# Patient Record
Sex: FEMALE | Race: WHITE | NOT HISPANIC OR LATINO | Employment: OTHER | RURAL
[De-identification: names, ages, dates, MRNs, and addresses within clinical notes are randomized per-mention and may not be internally consistent; named-entity substitution may affect disease eponyms.]

---

## 2020-03-11 ENCOUNTER — HISTORICAL (OUTPATIENT)
Dept: ADMINISTRATIVE | Facility: HOSPITAL | Age: 70
End: 2020-03-11

## 2020-03-11 LAB
ALBUMIN SERPL BCP-MCNC: 3.7 G/DL (ref 3.5–5)
ALP SERPL-CCNC: 62 U/L (ref 55–142)
ALT SERPL W P-5'-P-CCNC: 28 U/L (ref 13–56)
AST SERPL W P-5'-P-CCNC: 12 U/L (ref 15–37)
BASOPHILS # BLD AUTO: 0.04 X10E3/UL (ref 0–0.2)
BASOPHILS NFR BLD AUTO: 0.5 % (ref 0–1)
BILIRUB DIRECT SERPL-MCNC: 0.1 MG/DL (ref 0–0.2)
BILIRUB SERPL-MCNC: 0.5 MG/DL (ref 0–1.2)
BILIRUB UR QL STRIP: NEGATIVE MG/DL
BUN SERPL-MCNC: 17 MG/DL (ref 7–18)
CALCIUM SERPL-MCNC: 9.4 MG/DL (ref 8.5–10.1)
CHLORIDE SERPL-SCNC: 104 MMOL/L (ref 98–107)
CHOLEST SERPL-MCNC: 149 MG/DL
CHOLEST/HDLC SERPL: 3 {RATIO}
CLARITY UR: CLEAR
CO2 SERPL-SCNC: 27 MMOL/L (ref 21–32)
COLOR UR: YELLOW
CREAT SERPL-MCNC: 1.04 MG/DL (ref 0.5–1.02)
CREAT UR-MCNC: 120 MG/DL (ref 28–217)
EOSINOPHIL # BLD AUTO: 0.14 X10E3/UL (ref 0–0.5)
EOSINOPHIL NFR BLD AUTO: 1.8 % (ref 1–4)
ERYTHROCYTE [DISTWIDTH] IN BLOOD BY AUTOMATED COUNT: 13 % (ref 11.5–14.5)
EST. AVERAGE GLUCOSE BLD GHB EST-MCNC: 164 MG/DL
GLUCOSE SERPL-MCNC: 144 MG/DL (ref 74–106)
GLUCOSE UR STRIP-MCNC: NEGATIVE MG/DL
HBA1C MFR BLD HPLC: 7.5 %
HCT VFR BLD AUTO: 40.3 % (ref 38–47)
HDLC SERPL-MCNC: 50 MG/DL
HGB BLD-MCNC: 12.9 G/DL (ref 12–16)
IMM GRANULOCYTES # BLD AUTO: 0.03 X10E3/UL (ref 0–0.04)
IMM GRANULOCYTES NFR BLD: 0.4 % (ref 0–0.4)
KETONES UR STRIP-SCNC: NEGATIVE MG/DL
LDLC SERPL CALC-MCNC: 74 MG/DL
LEUKOCYTE ESTERASE UR QL STRIP: NEGATIVE LEU/UL
LYMPHOCYTES # BLD AUTO: 2.72 X10E3/UL (ref 1–4.8)
LYMPHOCYTES NFR BLD AUTO: 34 % (ref 27–41)
MCH RBC QN AUTO: 29.5 PG (ref 27–31)
MCHC RBC AUTO-ENTMCNC: 32 G/DL (ref 32–36)
MCV RBC AUTO: 92 FL (ref 80–96)
MICROALBUMIN UR-MCNC: 0.6 MG/DL (ref 0–2.8)
MICROALBUMIN/CREAT RATIO PNL UR: <30 MG/G (ref 0–30)
MONOCYTES # BLD AUTO: 0.5 X10E3/UL (ref 0–0.8)
MONOCYTES NFR BLD AUTO: 6.3 % (ref 2–6)
MPC BLD CALC-MCNC: 9.6 FL (ref 9.4–12.4)
NEUTROPHILS # BLD AUTO: 4.57 X10E3/UL (ref 1.8–7.7)
NEUTROPHILS NFR BLD AUTO: 57 % (ref 53–65)
NITRITE UR QL STRIP: NEGATIVE
NRBC # BLD AUTO: 0 X10E3/UL (ref 0–0)
NRBC, AUTO (.00): 0 /100 (ref 0–0)
PH UR STRIP: 5.5 PH UNITS (ref 5–8)
PLATELET # BLD AUTO: 287 X10E3/UL (ref 150–400)
POTASSIUM SERPL-SCNC: 3.9 MMOL/L (ref 3.5–5.1)
PROT SERPL-MCNC: 7.5 G/DL (ref 6.4–8.2)
PROT UR QL STRIP: NEGATIVE MG/DL
RBC # BLD AUTO: 4.38 X10E6/UL (ref 4.2–5.4)
RBC # UR STRIP: NEGATIVE ERY/UL
SODIUM SERPL-SCNC: 137 MMOL/L (ref 136–145)
SP GR UR STRIP: 1.02 (ref 1–1.03)
TRIGL SERPL-MCNC: 125 MG/DL
TSH SERPL DL<=0.005 MIU/L-ACNC: 1.21 UIU/ML (ref 0.36–3.74)
UROBILINOGEN UR STRIP-ACNC: 0.2 MG/DL
WBC # BLD AUTO: 8 X10E3/UL (ref 4.5–11)

## 2020-11-10 ENCOUNTER — HISTORICAL (OUTPATIENT)
Dept: ADMINISTRATIVE | Facility: HOSPITAL | Age: 70
End: 2020-11-10

## 2020-11-10 LAB
ANION GAP SERPL CALCULATED.3IONS-SCNC: 8.1 MMOL/L (ref 7–16)
BUN SERPL-MCNC: 13 MG/DL (ref 7–18)
CALCIUM SERPL-MCNC: 9 MG/DL (ref 8.5–10.1)
CHLORIDE SERPL-SCNC: 105 MMOL/L (ref 98–107)
CO2 SERPL-SCNC: 29 MMOL/L (ref 21–32)
CREAT SERPL-MCNC: 0.92 MG/DL (ref 0.5–1.02)
GLUCOSE SERPL-MCNC: 146 MG/DL (ref 74–106)
POTASSIUM SERPL-SCNC: 4.1 MMOL/L (ref 3.5–5.1)
SODIUM SERPL-SCNC: 138 MMOL/L (ref 136–145)

## 2020-11-12 LAB
REPORT: NO GROWTH
REPORT: NORMAL

## 2020-11-23 ENCOUNTER — HISTORICAL (OUTPATIENT)
Dept: ADMINISTRATIVE | Facility: HOSPITAL | Age: 70
End: 2020-11-23

## 2020-11-23 LAB
EST. AVERAGE GLUCOSE BLD GHB EST-MCNC: 177 MG/DL
HBA1C MFR BLD HPLC: 7.9 % (ref 4.5–6.6)

## 2020-11-24 LAB
ANION GAP SERPL CALCULATED.3IONS-SCNC: 8 MMOL/L (ref 7–16)
BASOPHILS # BLD AUTO: 0.05 X10E3/UL (ref 0–0.2)
BASOPHILS NFR BLD AUTO: 0.6 % (ref 0–1)
BUN SERPL-MCNC: 16 MG/DL (ref 7–18)
CALCIUM SERPL-MCNC: 9.4 MG/DL (ref 8.5–10.1)
CHLORIDE SERPL-SCNC: 104 MMOL/L (ref 98–107)
CO2 SERPL-SCNC: 28 MMOL/L (ref 21–32)
CREAT SERPL-MCNC: 1.05 MG/DL (ref 0.5–1.02)
EOSINOPHIL # BLD AUTO: 0.11 X10E3/UL (ref 0–0.5)
EOSINOPHIL NFR BLD AUTO: 1.3 % (ref 1–4)
ERYTHROCYTE [DISTWIDTH] IN BLOOD BY AUTOMATED COUNT: 12.8 % (ref 11.5–14.5)
GLUCOSE SERPL-MCNC: 189 MG/DL (ref 74–106)
HCT VFR BLD AUTO: 39.3 % (ref 38–47)
HGB BLD-MCNC: 12.9 G/DL (ref 12–16)
IMM GRANULOCYTES # BLD AUTO: 0.01 X10E3/UL (ref 0–0.04)
IMM GRANULOCYTES NFR BLD: 0.1 % (ref 0–0.4)
LYMPHOCYTES # BLD AUTO: 3.32 X10E3/UL (ref 1–4.8)
LYMPHOCYTES NFR BLD AUTO: 38.2 % (ref 27–41)
MCH RBC QN AUTO: 30.4 PG (ref 27–31)
MCHC RBC AUTO-ENTMCNC: 32.8 G/DL (ref 32–36)
MCV RBC AUTO: 92.5 FL (ref 80–96)
MONOCYTES # BLD AUTO: 0.6 X10E3/UL (ref 0–0.8)
MONOCYTES NFR BLD AUTO: 6.9 % (ref 2–6)
MPC BLD CALC-MCNC: 10 FL (ref 9.4–12.4)
NEUTROPHILS # BLD AUTO: 4.59 X10E3/UL (ref 1.8–7.7)
NEUTROPHILS NFR BLD AUTO: 52.9 % (ref 53–65)
NRBC # BLD AUTO: 0 X10E3/UL (ref 0–0)
NRBC, AUTO (.00): 0 /100 (ref 0–0)
PLATELET # BLD AUTO: 286 X10E3/UL (ref 150–400)
POTASSIUM SERPL-SCNC: 4.2 MMOL/L (ref 3.5–5.1)
RBC # BLD AUTO: 4.25 X10E6/UL (ref 4.2–5.4)
SODIUM SERPL-SCNC: 136 MMOL/L (ref 136–145)
WBC # BLD AUTO: 8.68 X10E3/UL (ref 4.5–11)

## 2020-11-25 LAB
REPORT: NORMAL

## 2020-12-22 ENCOUNTER — HISTORICAL (OUTPATIENT)
Dept: ADMINISTRATIVE | Facility: HOSPITAL | Age: 70
End: 2020-12-22

## 2020-12-22 LAB
ANION GAP SERPL CALCULATED.3IONS-SCNC: 7.6 MMOL/L (ref 7–16)
BUN SERPL-MCNC: 18 MG/DL (ref 7–18)
CALCIUM SERPL-MCNC: 9.8 MG/DL (ref 8.5–10.1)
CHLORIDE SERPL-SCNC: 104 MMOL/L (ref 98–107)
CO2 SERPL-SCNC: 30 MMOL/L (ref 21–32)
CREAT SERPL-MCNC: 0.97 MG/DL (ref 0.5–1.02)
GLUCOSE SERPL-MCNC: 140 MG/DL (ref 74–106)
POTASSIUM SERPL-SCNC: 4.6 MMOL/L (ref 3.5–5.1)
SODIUM SERPL-SCNC: 137 MMOL/L (ref 136–145)

## 2021-01-18 ENCOUNTER — HISTORICAL (OUTPATIENT)
Dept: ADMINISTRATIVE | Facility: HOSPITAL | Age: 71
End: 2021-01-18

## 2021-01-20 LAB
REPORT: NO GROWTH
REPORT: NORMAL

## 2021-04-14 ENCOUNTER — HISTORICAL (OUTPATIENT)
Dept: ADMINISTRATIVE | Facility: HOSPITAL | Age: 71
End: 2021-04-14

## 2021-04-14 LAB — SARS-COV+SARS-COV-2 AG RESP QL IA.RAPID: NEGATIVE

## 2021-07-06 ENCOUNTER — HOSPITAL ENCOUNTER (OUTPATIENT)
Dept: RADIOLOGY | Facility: HOSPITAL | Age: 71
Discharge: HOME | End: 2021-07-06
Payer: MEDICARE

## 2021-07-06 VITALS — HEIGHT: 67 IN | WEIGHT: 218 LBS | BODY MASS INDEX: 34.21 KG/M2

## 2021-07-06 DIAGNOSIS — Z12.31 VISIT FOR SCREENING MAMMOGRAM: ICD-10-CM

## 2021-07-06 PROCEDURE — 77067 SCR MAMMO BI INCL CAD: CPT | Mod: 26,,, | Performed by: RADIOLOGY

## 2021-07-06 PROCEDURE — 77067 MAMMO DIGITAL SCREENING BILAT: ICD-10-PCS | Mod: 26,,, | Performed by: RADIOLOGY

## 2021-07-06 PROCEDURE — 77067 SCR MAMMO BI INCL CAD: CPT | Mod: TC

## 2022-05-06 ENCOUNTER — HOSPITAL ENCOUNTER (EMERGENCY)
Facility: HOSPITAL | Age: 72
Discharge: HOME | End: 2022-05-06
Attending: INTERNAL MEDICINE
Payer: MEDICARE

## 2022-05-06 ENCOUNTER — TELEPHONE (OUTPATIENT)
Dept: EMERGENCY MEDICINE | Facility: HOSPITAL | Age: 72
End: 2022-05-06
Payer: MEDICARE

## 2022-05-06 VITALS
WEIGHT: 222 LBS | OXYGEN SATURATION: 97 % | BODY MASS INDEX: 34.84 KG/M2 | DIASTOLIC BLOOD PRESSURE: 85 MMHG | RESPIRATION RATE: 18 BRPM | TEMPERATURE: 98 F | SYSTOLIC BLOOD PRESSURE: 161 MMHG | HEART RATE: 78 BPM | HEIGHT: 67 IN

## 2022-05-06 DIAGNOSIS — I10 HYPERTENSION: ICD-10-CM

## 2022-05-06 DIAGNOSIS — N13.2 HYDRONEPHROSIS WITH URINARY OBSTRUCTION DUE TO URETERAL CALCULUS: ICD-10-CM

## 2022-05-06 DIAGNOSIS — N23 RENAL COLIC ON RIGHT SIDE: Primary | ICD-10-CM

## 2022-05-06 LAB
ALBUMIN SERPL BCP-MCNC: 3.3 G/DL (ref 3.5–5)
ALBUMIN/GLOB SERPL: 1 {RATIO}
ALP SERPL-CCNC: 68 U/L (ref 55–142)
ALT SERPL W P-5'-P-CCNC: 25 U/L (ref 13–56)
ANION GAP SERPL CALCULATED.3IONS-SCNC: 15 MMOL/L (ref 7–16)
AST SERPL W P-5'-P-CCNC: 18 U/L (ref 15–37)
BACTERIA #/AREA URNS HPF: ABNORMAL /HPF
BASOPHILS # BLD AUTO: 0.03 K/UL (ref 0–0.2)
BASOPHILS NFR BLD AUTO: 0.3 % (ref 0–1)
BILIRUB SERPL-MCNC: 0.3 MG/DL (ref 0–1.2)
BILIRUB UR QL STRIP: NEGATIVE
BUN SERPL-MCNC: 20 MG/DL (ref 7–18)
BUN/CREAT SERPL: 22 (ref 6–20)
CALCIUM SERPL-MCNC: 8.9 MG/DL (ref 8.5–10.1)
CHLORIDE SERPL-SCNC: 100 MMOL/L (ref 98–107)
CLARITY UR: CLEAR
CO2 SERPL-SCNC: 25 MMOL/L (ref 21–32)
COLOR UR: YELLOW
CREAT SERPL-MCNC: 0.91 MG/DL (ref 0.55–1.02)
DIFFERENTIAL METHOD BLD: ABNORMAL
EOSINOPHIL # BLD AUTO: 0.03 K/UL (ref 0–0.5)
EOSINOPHIL NFR BLD AUTO: 0.3 % (ref 1–4)
ERYTHROCYTE [DISTWIDTH] IN BLOOD BY AUTOMATED COUNT: 13.4 % (ref 11.5–14.5)
GLOBULIN SER-MCNC: 3.4 G/DL (ref 2–4)
GLUCOSE SERPL-MCNC: 181 MG/DL (ref 74–106)
GLUCOSE UR STRIP-MCNC: 100 MG/DL
HCT VFR BLD AUTO: 36.4 % (ref 38–47)
HGB BLD-MCNC: 12.1 G/DL (ref 12–16)
IMM GRANULOCYTES # BLD AUTO: 0.02 K/UL (ref 0–0.04)
IMM GRANULOCYTES NFR BLD: 0.2 % (ref 0–0.4)
KETONES UR STRIP-SCNC: 15 MG/DL
LEUKOCYTE ESTERASE UR QL STRIP: NEGATIVE
LIPASE SERPL-CCNC: 154 U/L (ref 73–393)
LYMPHOCYTES # BLD AUTO: 1.85 K/UL (ref 1–4.8)
LYMPHOCYTES NFR BLD AUTO: 17.8 % (ref 27–41)
MCH RBC QN AUTO: 30.6 PG (ref 27–31)
MCHC RBC AUTO-ENTMCNC: 33.2 G/DL (ref 32–36)
MCV RBC AUTO: 92.2 FL (ref 80–96)
MIXED CELL CASTS #/AREA UR COMP ASSIST: ABNORMAL /LPF
MONOCYTES # BLD AUTO: 0.72 K/UL (ref 0–0.8)
MONOCYTES NFR BLD AUTO: 6.9 % (ref 2–6)
MPC BLD CALC-MCNC: 9.1 FL (ref 9.4–12.4)
NEUTROPHILS # BLD AUTO: 7.77 K/UL (ref 1.8–7.7)
NEUTROPHILS NFR BLD AUTO: 74.5 % (ref 53–65)
NITRITE UR QL STRIP: NEGATIVE
PH UR STRIP: 5.5 PH UNITS
PLATELET # BLD AUTO: 251 K/UL (ref 150–400)
POTASSIUM SERPL-SCNC: 3.5 MMOL/L (ref 3.5–5.1)
PROT SERPL-MCNC: 6.7 G/DL (ref 6.4–8.2)
PROT UR QL STRIP: NEGATIVE
RBC # BLD AUTO: 3.95 M/UL (ref 4.2–5.4)
RBC # UR STRIP: ABNORMAL /UL
RBC #/AREA URNS HPF: ABNORMAL /HPF
SODIUM SERPL-SCNC: 136 MMOL/L (ref 136–145)
SP GR UR STRIP: 1.02
SQUAMOUS #/AREA URNS LPF: ABNORMAL /LPF
UROBILINOGEN UR STRIP-ACNC: 0.2 MG/DL
WBC # BLD AUTO: 10.42 K/UL (ref 4.5–11)
WBC #/AREA URNS HPF: ABNORMAL /HPF

## 2022-05-06 PROCEDURE — 36415 COLL VENOUS BLD VENIPUNCTURE: CPT | Performed by: INTERNAL MEDICINE

## 2022-05-06 PROCEDURE — 83690 ASSAY OF LIPASE: CPT | Performed by: INTERNAL MEDICINE

## 2022-05-06 PROCEDURE — 99283 EMERGENCY DEPT VISIT LOW MDM: CPT | Performed by: INTERNAL MEDICINE

## 2022-05-06 PROCEDURE — 93005 ELECTROCARDIOGRAM TRACING: CPT

## 2022-05-06 PROCEDURE — 99285 EMERGENCY DEPT VISIT HI MDM: CPT | Mod: 25

## 2022-05-06 PROCEDURE — 63600175 PHARM REV CODE 636 W HCPCS: Performed by: INTERNAL MEDICINE

## 2022-05-06 PROCEDURE — 81001 URINALYSIS AUTO W/SCOPE: CPT | Performed by: INTERNAL MEDICINE

## 2022-05-06 PROCEDURE — 96375 TX/PRO/DX INJ NEW DRUG ADDON: CPT

## 2022-05-06 PROCEDURE — 93010 ELECTROCARDIOGRAM REPORT: CPT | Performed by: INTERNAL MEDICINE

## 2022-05-06 PROCEDURE — 96376 TX/PRO/DX INJ SAME DRUG ADON: CPT

## 2022-05-06 PROCEDURE — 80053 COMPREHEN METABOLIC PANEL: CPT | Performed by: INTERNAL MEDICINE

## 2022-05-06 PROCEDURE — 96374 THER/PROPH/DIAG INJ IV PUSH: CPT

## 2022-05-06 PROCEDURE — 85025 COMPLETE CBC W/AUTO DIFF WBC: CPT | Performed by: INTERNAL MEDICINE

## 2022-05-06 RX ORDER — KETOROLAC TROMETHAMINE 10 MG/1
10 TABLET, FILM COATED ORAL EVERY 6 HOURS
Qty: 20 TABLET | Refills: 0 | Status: SHIPPED | OUTPATIENT
Start: 2022-05-06 | End: 2022-05-11

## 2022-05-06 RX ORDER — LEVOCETIRIZINE DIHYDROCHLORIDE 5 MG/1
5 TABLET, FILM COATED ORAL DAILY PRN
COMMUNITY
Start: 2022-04-20

## 2022-05-06 RX ORDER — METFORMIN HYDROCHLORIDE 1000 MG/1
1000 TABLET ORAL 2 TIMES DAILY
COMMUNITY
Start: 2022-04-20

## 2022-05-06 RX ORDER — GLIMEPIRIDE 1 MG/1
1 TABLET ORAL DAILY
COMMUNITY
Start: 2022-04-26

## 2022-05-06 RX ORDER — ASPIRIN 81 MG/1
81 TABLET ORAL DAILY
COMMUNITY
Start: 2022-03-07

## 2022-05-06 RX ORDER — KETOROLAC TROMETHAMINE 15 MG/ML
15 INJECTION, SOLUTION INTRAMUSCULAR; INTRAVENOUS
Status: COMPLETED | OUTPATIENT
Start: 2022-05-06 | End: 2022-05-06

## 2022-05-06 RX ORDER — OXYCODONE AND ACETAMINOPHEN 5; 325 MG/1; MG/1
1 TABLET ORAL EVERY 4 HOURS PRN
Qty: 12 TABLET | Refills: 0 | Status: SHIPPED | OUTPATIENT
Start: 2022-05-06

## 2022-05-06 RX ORDER — ONDANSETRON 2 MG/ML
4 INJECTION INTRAMUSCULAR; INTRAVENOUS
Status: COMPLETED | OUTPATIENT
Start: 2022-05-06 | End: 2022-05-06

## 2022-05-06 RX ORDER — EZETIMIBE 10 MG/1
10 TABLET ORAL DAILY
COMMUNITY
Start: 2022-02-07

## 2022-05-06 RX ORDER — TRIAMTERENE/HYDROCHLOROTHIAZID 37.5-25 MG
1 TABLET ORAL EVERY OTHER DAY
COMMUNITY

## 2022-05-06 RX ORDER — ROSUVASTATIN CALCIUM 5 MG/1
5 TABLET, COATED ORAL DAILY
COMMUNITY
Start: 2022-03-07

## 2022-05-06 RX ORDER — KETOROLAC TROMETHAMINE 30 MG/ML
15 INJECTION, SOLUTION INTRAMUSCULAR; INTRAVENOUS
Status: COMPLETED | OUTPATIENT
Start: 2022-05-06 | End: 2022-05-06

## 2022-05-06 RX ORDER — POLYETHYLENE GLYCOL 3350 17 G/17G
17 POWDER, FOR SOLUTION ORAL DAILY
COMMUNITY

## 2022-05-06 RX ORDER — VERAPAMIL HYDROCHLORIDE 120 MG/1
120 TABLET, FILM COATED, EXTENDED RELEASE ORAL DAILY
COMMUNITY
Start: 2022-04-04

## 2022-05-06 RX ADMIN — KETOROLAC TROMETHAMINE 15 MG: 30 INJECTION, SOLUTION INTRAMUSCULAR at 05:05

## 2022-05-06 RX ADMIN — ONDANSETRON 4 MG: 2 INJECTION INTRAMUSCULAR; INTRAVENOUS at 05:05

## 2022-05-06 RX ADMIN — KETOROLAC TROMETHAMINE 15 MG: 15 INJECTION, SOLUTION INTRAMUSCULAR; INTRAVENOUS at 07:05

## 2022-05-06 NOTE — ED PROVIDER NOTES
Encounter Date: 5/6/2022       History     Chief Complaint   Patient presents with    Dysuria    Nausea    Abdominal Pain     Patient comes in complaining of some lower abdominal pain on the right side with some dysuria.  Some nausea and no vomiting.  No fever chills.  Last bowel movement was prior to coming here.  No chest pain shortness of breath.        Review of patient's allergies indicates:   Allergen Reactions    Cardura [doxazosin] Shortness Of Breath    Lisinopril Other (See Comments)     Persistent cough    Procardia [nifedipine] Shortness Of Breath    Singulair [montelukast] Shortness Of Breath    Toprol xl [metoprolol succinate] Shortness Of Breath    Ciprofloxacin Other (See Comments)     Achilles tendon pain     Flonase [fluticasone] Other (See Comments)     Headache     Norvasc [amlodipine] Other (See Comments)     Raw gums     Medrol [methylprednisolone] Rash     Past Medical History:   Diagnosis Date    Cardiac pacemaker     Diabetes mellitus     Hypertension     Mixed hyperlipidemia      Past Surgical History:   Procedure Laterality Date    HYSTERECTOMY       Family History   Problem Relation Age of Onset    Breast cancer Sister     Breast cancer Sister      Social History     Tobacco Use    Smoking status: Never Smoker    Smokeless tobacco: Never Used   Substance Use Topics    Alcohol use: Never    Drug use: Never     Review of Systems   Constitutional: Negative for fever.   HENT: Negative for sore throat.    Respiratory: Negative for shortness of breath.    Cardiovascular: Negative for chest pain.   Gastrointestinal: Negative for nausea.   Genitourinary: Negative for dysuria.   Musculoskeletal: Negative for back pain.   Skin: Negative for rash.   Neurological: Negative for weakness.   Hematological: Does not bruise/bleed easily.       Physical Exam     Initial Vitals [05/06/22 0437]   BP Pulse Resp Temp SpO2   (!) 194/89 78 18 97.9 °F (36.6 °C) 97 %      MAP       --          Physical Exam    Vitals reviewed.  Constitutional: She appears well-developed.   Eyes: Pupils are equal, round, and reactive to light.   Neck:   Normal range of motion.  Cardiovascular: Normal rate.   Pulmonary/Chest: Breath sounds normal.   Abdominal: Abdomen is soft.   There is right CVA tenderness.      Musculoskeletal:         General: Normal range of motion.      Cervical back: Normal range of motion.     Neurological: She is alert.   Skin: Skin is warm.   Psychiatric: She has a normal mood and affect.         Medical Screening Exam   See Full Note    ED Course   Procedures  Labs Reviewed   URINALYSIS, REFLEX TO URINE CULTURE - Abnormal; Notable for the following components:       Result Value    Glucose,   (*)     Ketones, UA 15  (*)     Blood, UA Large (*)     All other components within normal limits   URINALYSIS, MICROSCOPIC - Abnormal; Notable for the following components:    RBC, UA 25-50 (*)     Bacteria, UA Occasional (*)     Cellular Casts 0-2 (*)     All other components within normal limits   COMPREHENSIVE METABOLIC PANEL - Abnormal; Notable for the following components:    Glucose 181 (*)     BUN 20 (*)     BUN/Creatinine Ratio 22 (*)     Albumin 3.3 (*)     All other components within normal limits   CBC WITH DIFFERENTIAL - Abnormal; Notable for the following components:    RBC 3.95 (*)     Hematocrit 36.4 (*)     MPV 9.1 (*)     Neutrophils % 74.5 (*)     Lymphocytes % 17.8 (*)     Neutrophils, Abs 7.77 (*)     Monocytes % 6.9 (*)     Eosinophils % 0.3 (*)     All other components within normal limits   LIPASE - Normal   CBC W/ AUTO DIFFERENTIAL    Narrative:     The following orders were created for panel order CBC W/ AUTO DIFFERENTIAL.  Procedure                               Abnormality         Status                     ---------                               -----------         ------                     CBC with Differential[[id number]]        Abnormal            Final result                  Please view results for these tests on the individual orders.        ECG Results          EKG 12-lead (Preliminary result)  Result time 05/06/22 05:19:54    ED Interpretation by Thor Curry MD (05/06/22 05:19:54, [facility], Emergency Medicine)    Atrial ventricle paste rhythm heart rate 73 bundle branch block pattern pacemaker                            Imaging Results          CT Renal Stone Study ABD Pelvis WO (Preliminary result)  Result time 05/06/22 06:59:31    ED Interpretation by Thor Curry MD (05/06/22 06:59:31, [facility] Emergency Department, Emergency Medicine)    The right kidney shows hydronephrosis and there was a 9 mm obstructing stone at the right ureterovesicular junction otherwise negative CT                               Medications   ketorolac injection 15 mg (has no administration in time range)   ketorolac injection 15 mg (15 mg Intravenous Given 5/6/22 0522)   ondansetron injection 4 mg (4 mg Intravenous Given 5/6/22 0536)     Medical Decision Making:   ED Management:  I spoke with the patient and her  about the CT scan showing a 9 mm stone that is gone down to the UV junction.  Told him that at this point normally the stone would not past and required lithotripsy.  However there did not want to be transferred this point in prefers try to see if they can make it at home.  Advise if the need to to return back to emergency room ago to [place] if possible for care.             ED Course as of 05/06/22 0711   Fri May 06, 2022   0519 EKG 12-lead [PW]   0653 Lipase [PW]   0653 CBC W/ AUTO DIFFERENTIAL(!) [PW]   0653 Comp. Metabolic Panel(!) [PW]   0653 RBC, UA(!): 25-50 [PW]   0653 Still awaiting results of CT scan [PW]      ED Course User Index  [PW] Thor Curry MD          Clinical Impression:   Final diagnoses:  [I10] Hypertension  [N23] Renal colic on right side (Primary)  [N13.2] Hydronephrosis with urinary obstruction due to  ureteral calculus          ED Disposition Condition    Discharge Stable        ED Prescriptions     Medication Sig Dispense Start Date End Date Auth. Provider    ketorolac (TORADOL) 10 mg tablet Take 1 tablet (10 mg total) by mouth every 6 (six) hours. for 5 days 20 tablet 5/6/2022 5/11/2022 Thor Curry MD    oxyCODONE-acetaminophen (PERCOCET) 5-325 mg per tablet Take 1 tablet by mouth every 4 (four) hours as needed for Pain. 12 tablet 5/6/2022  Thor Curry MD        Follow-up Information     Follow up With Specialties Details Why Contact Info    Jessica Medrano MD Family Medicine In 3 days  [id number]  [facility] [id number] [phone]             Thor Curry MD  05/06/22 0710       Thor Curry MD  05/06/22 0765

## 2022-05-06 NOTE — ED NOTES
PAIN MED GIVEN AS ORDERED - PT ELECTS NO TRANSFER AT THIS TIME - PT REQUESTS TO GO HOME AND FOLLOW UP with urologist- dr hogan sent in referral for urologist

## 2022-05-08 ENCOUNTER — TELEPHONE (OUTPATIENT)
Dept: EMERGENCY MEDICINE | Facility: HOSPITAL | Age: 72
End: 2022-05-08
Payer: MEDICARE

## 2022-06-01 ENCOUNTER — HOSPITAL ENCOUNTER (OUTPATIENT)
Dept: RADIOLOGY | Facility: HOSPITAL | Age: 72
Discharge: HOME | End: 2022-06-01
Payer: MEDICARE

## 2022-06-01 ENCOUNTER — HOSPITAL ENCOUNTER (OUTPATIENT)
Dept: RADIOLOGY | Facility: HOSPITAL | Age: 72
Discharge: HOME | End: 2022-06-01
Attending: FAMILY MEDICINE
Payer: MEDICARE

## 2022-06-01 VITALS — BODY MASS INDEX: 34.21 KG/M2 | WEIGHT: 218 LBS | HEIGHT: 67 IN

## 2022-06-01 DIAGNOSIS — R92.8 ABNORMAL MAMMOGRAM: ICD-10-CM

## 2022-06-01 DIAGNOSIS — N64.4 BREAST PAIN: ICD-10-CM

## 2022-06-01 PROCEDURE — 76641 ULTRASOUND BREAST COMPLETE: CPT | Mod: TC,50

## 2022-06-01 PROCEDURE — 77066 DX MAMMO INCL CAD BI: CPT | Mod: 26,,, | Performed by: RADIOLOGY

## 2022-06-01 PROCEDURE — 77066 DX MAMMO INCL CAD BI: CPT | Mod: TC

## 2022-06-01 PROCEDURE — 77066 MAMMO DIGITAL DIAGNOSTIC BILAT: ICD-10-PCS | Mod: 26,,, | Performed by: RADIOLOGY

## 2022-06-01 PROCEDURE — 76641 ULTRASOUND BREAST COMPLETE: CPT | Mod: 26,50,, | Performed by: RADIOLOGY

## 2022-06-01 PROCEDURE — 76641 US BREAST BILATERAL COMPLETE: ICD-10-PCS | Mod: 26,50,, | Performed by: RADIOLOGY

## 2023-07-05 ENCOUNTER — HOSPITAL ENCOUNTER (OUTPATIENT)
Dept: RADIOLOGY | Facility: HOSPITAL | Age: 73
Discharge: HOME | End: 2023-07-05
Attending: RADIOLOGY
Payer: MEDICARE

## 2023-07-05 ENCOUNTER — HOSPITAL ENCOUNTER (OUTPATIENT)
Dept: RADIOLOGY | Facility: HOSPITAL | Age: 73
Discharge: HOME | End: 2023-07-05
Payer: MEDICARE

## 2023-07-05 DIAGNOSIS — N64.59 ABNORMAL BREAST EXAM: ICD-10-CM

## 2023-07-05 DIAGNOSIS — Z12.31 VISIT FOR SCREENING MAMMOGRAM: ICD-10-CM

## 2023-07-05 PROCEDURE — 77067 SCR MAMMO BI INCL CAD: CPT | Mod: 26,,, | Performed by: RADIOLOGY

## 2023-07-05 PROCEDURE — 76641 ULTRASOUND BREAST COMPLETE: CPT | Mod: TC,50

## 2023-07-05 PROCEDURE — 76641 ULTRASOUND BREAST COMPLETE: CPT | Mod: 26,50,, | Performed by: RADIOLOGY

## 2023-07-05 PROCEDURE — 77067 MAMMO DIGITAL SCREENING BILAT: ICD-10-PCS | Mod: 26,,, | Performed by: RADIOLOGY

## 2023-07-05 PROCEDURE — 77067 SCR MAMMO BI INCL CAD: CPT | Mod: TC

## 2023-07-05 PROCEDURE — 76641 US BREAST BILATERAL COMPLETE: ICD-10-PCS | Mod: 26,50,, | Performed by: RADIOLOGY

## 2023-11-01 DIAGNOSIS — N61.1 ABSCESS OF BREAST: Primary | ICD-10-CM

## 2023-11-27 ENCOUNTER — HOSPITAL ENCOUNTER (OUTPATIENT)
Dept: RADIOLOGY | Facility: HOSPITAL | Age: 73
Discharge: HOME | End: 2023-11-27
Attending: FAMILY MEDICINE
Payer: MEDICARE

## 2023-11-27 DIAGNOSIS — N61.1 ABSCESS OF BREAST: ICD-10-CM

## 2023-11-27 PROCEDURE — 76641 ULTRASOUND BREAST COMPLETE: CPT | Mod: TC,50

## 2023-11-27 PROCEDURE — 76641 US BREAST BILATERAL COMPLETE: ICD-10-PCS | Mod: 26,50,, | Performed by: RADIOLOGY

## 2023-11-27 PROCEDURE — 76641 ULTRASOUND BREAST COMPLETE: CPT | Mod: 26,50,, | Performed by: RADIOLOGY

## 2024-08-22 ENCOUNTER — HOSPITAL ENCOUNTER (OUTPATIENT)
Dept: RADIOLOGY | Facility: HOSPITAL | Age: 74
Discharge: HOME | End: 2024-08-22
Payer: MEDICARE

## 2024-08-22 ENCOUNTER — HOSPITAL ENCOUNTER (OUTPATIENT)
Dept: RADIOLOGY | Facility: HOSPITAL | Age: 74
Discharge: HOME | End: 2024-08-22
Attending: RADIOLOGY
Payer: MEDICARE

## 2024-08-22 DIAGNOSIS — R92.8 ABNORMAL MAMMOGRAM: ICD-10-CM

## 2024-08-22 DIAGNOSIS — Z12.31 VISIT FOR SCREENING MAMMOGRAM: ICD-10-CM

## 2024-08-22 PROCEDURE — 77063 BREAST TOMOSYNTHESIS BI: CPT | Mod: TC

## 2024-08-22 PROCEDURE — 77067 SCR MAMMO BI INCL CAD: CPT | Mod: TC

## 2024-08-22 PROCEDURE — 76641 ULTRASOUND BREAST COMPLETE: CPT | Mod: TC,50

## 2024-08-22 PROCEDURE — 77067 SCR MAMMO BI INCL CAD: CPT | Mod: 26,,, | Performed by: RADIOLOGY

## 2024-08-22 PROCEDURE — 77063 BREAST TOMOSYNTHESIS BI: CPT | Mod: 26,,, | Performed by: RADIOLOGY

## 2024-08-22 PROCEDURE — 76641 ULTRASOUND BREAST COMPLETE: CPT | Mod: 26,50,, | Performed by: RADIOLOGY

## 2024-12-27 ENCOUNTER — TELEPHONE (OUTPATIENT)
Dept: GASTROENTEROLOGY | Facility: CLINIC | Age: 74
End: 2024-12-27
Payer: MEDICARE

## 2024-12-30 DIAGNOSIS — Z12.11 ENCOUNTER FOR SCREENING COLONOSCOPY: Primary | ICD-10-CM

## 2025-05-08 ENCOUNTER — ANESTHESIA (OUTPATIENT)
Dept: GASTROENTEROLOGY | Facility: HOSPITAL | Age: 75
End: 2025-05-08
Payer: MEDICARE

## 2025-05-08 ENCOUNTER — HOSPITAL ENCOUNTER (OUTPATIENT)
Dept: GASTROENTEROLOGY | Facility: HOSPITAL | Age: 75
Discharge: HOME | End: 2025-05-08
Attending: INTERNAL MEDICINE | Admitting: INTERNAL MEDICINE
Payer: MEDICARE

## 2025-05-08 ENCOUNTER — ANESTHESIA EVENT (OUTPATIENT)
Dept: GASTROENTEROLOGY | Facility: HOSPITAL | Age: 75
End: 2025-05-08
Payer: MEDICARE

## 2025-05-08 VITALS
BODY MASS INDEX: 32.96 KG/M2 | WEIGHT: 210 LBS | HEART RATE: 61 BPM | OXYGEN SATURATION: 100 % | RESPIRATION RATE: 18 BRPM | HEIGHT: 67 IN | DIASTOLIC BLOOD PRESSURE: 56 MMHG | TEMPERATURE: 99 F | SYSTOLIC BLOOD PRESSURE: 116 MMHG

## 2025-05-08 DIAGNOSIS — Z86.0100 HISTORY OF COLON POLYPS: Primary | ICD-10-CM

## 2025-05-08 DIAGNOSIS — D12.3 ADENOMATOUS POLYP OF TRANSVERSE COLON: ICD-10-CM

## 2025-05-08 DIAGNOSIS — Z12.11 ENCOUNTER FOR SCREENING COLONOSCOPY: ICD-10-CM

## 2025-05-08 DIAGNOSIS — K57.30 COLON, DIVERTICULOSIS: ICD-10-CM

## 2025-05-08 PROCEDURE — 27201423 OPTIME MED/SURG SUP & DEVICES STERILE SUPPLY

## 2025-05-08 PROCEDURE — 37000008 HC ANESTHESIA 1ST 15 MINUTES

## 2025-05-08 PROCEDURE — 63600175 PHARM REV CODE 636 W HCPCS: Performed by: NURSE ANESTHETIST, CERTIFIED REGISTERED

## 2025-05-08 PROCEDURE — 88305 TISSUE EXAM BY PATHOLOGIST: CPT | Mod: TC,SUR | Performed by: INTERNAL MEDICINE

## 2025-05-08 RX ORDER — SODIUM CHLORIDE, SODIUM LACTATE, POTASSIUM CHLORIDE, CALCIUM CHLORIDE 600; 310; 30; 20 MG/100ML; MG/100ML; MG/100ML; MG/100ML
INJECTION, SOLUTION INTRAVENOUS CONTINUOUS
Status: DISCONTINUED | OUTPATIENT
Start: 2025-05-08 | End: 2025-05-09 | Stop reason: HOSPADM

## 2025-05-08 RX ORDER — LIDOCAINE HYDROCHLORIDE 20 MG/ML
INJECTION INTRAVENOUS
Status: DISCONTINUED | OUTPATIENT
Start: 2025-05-08 | End: 2025-05-08

## 2025-05-08 RX ORDER — SODIUM CHLORIDE 0.9 % (FLUSH) 0.9 %
10 SYRINGE (ML) INJECTION EVERY 6 HOURS PRN
Status: DISCONTINUED | OUTPATIENT
Start: 2025-05-08 | End: 2025-05-09 | Stop reason: HOSPADM

## 2025-05-08 RX ORDER — PROPOFOL 10 MG/ML
VIAL (ML) INTRAVENOUS
Status: DISCONTINUED | OUTPATIENT
Start: 2025-05-08 | End: 2025-05-08

## 2025-05-08 RX ADMIN — LIDOCAINE HYDROCHLORIDE 100 MG: 20 INJECTION, SOLUTION INTRAVENOUS at 10:05

## 2025-05-08 RX ADMIN — PROPOFOL 50 MG: 10 INJECTION, EMULSION INTRAVENOUS at 10:05

## 2025-05-08 NOTE — H&P
[facility]  Gastroenterology  H&P    Patient Name: Juanita Noble  MRN: [id number]  Admission Date: 5/8/2025  Code Status: No Order    Attending Provider: Roldan Carter MD   Primary Care Physician: Jessica Medrano MD  Principal Problem:<principal problem not specified>    Subjective:     History of Present Illness:  This patient is a 74-year-old female who presents for screening colonoscopy.  No prior colonoscopy report is available in the chart.    Past Medical History:   Diagnosis Date    Cardiac pacemaker     Diabetes mellitus     Hypertension     Mixed hyperlipidemia        Past Surgical History:   Procedure Laterality Date    CATARACT EXTRACTION, BILATERAL      ELBOW SURGERY      HYSTERECTOMY      INSERTION OF PACEMAKER      TEAR DUCT SURGERY         Review of patient's allergies indicates:   Allergen Reactions    Cardura [doxazosin] Shortness Of Breath    Lisinopril Other (See Comments)     Persistent cough    Procardia [nifedipine] Shortness Of Breath    Singulair [montelukast] Shortness Of Breath    Toprol xl [metoprolol succinate] Shortness Of Breath    Ciprofloxacin Other (See Comments)     Achilles tendon pain     Flonase [fluticasone] Other (See Comments)     Headache     Norvasc [amlodipine] Other (See Comments)     Raw gums     Medrol [methylprednisolone] Rash     Family History       Problem Relation (Age of Onset)    Breast cancer Sister, Sister          Tobacco Use    Smoking status: Never    Smokeless tobacco: Never   Substance and Sexual Activity    Alcohol use: Never    Drug use: Never    Sexual activity: Not on file     Review of Systems   Constitutional: Negative.    HENT: Negative.     Respiratory: Negative.     Cardiovascular: Negative.    Gastrointestinal: Negative.      Objective:     Vital Signs (Most Recent):  Temp: 97.6 °F (36.4 °C) (05/08/25 0924)  Pulse: 75 (05/08/25 0924)  Resp: 12 (05/08/25 0924)  BP: (!) 168/70 (05/08/25 0924)  SpO2: 100 % (05/08/25 0924) Vital  "Signs (24h Range):  Temp:  [97.6 °F (36.4 °C)] 97.6 °F (36.4 °C)  Pulse:  [75] 75  Resp:  [12] 12  SpO2:  [100 %] 100 %  BP: (168)/(70) 168/70     Weight: 95.3 kg (210 lb) (05/08/25 0924)  Body mass index is 32.89 kg/m².    No intake or output data in the 24 hours ending 05/08/25 1006    Lines/Drains/Airways       Peripheral Intravenous Line  Duration                  Peripheral IV - Single Lumen 05/08/25 0931 22 G Left Antecubital <1 day                    Physical Exam  Vitals reviewed.   Constitutional:       General: She is not in acute distress.     Appearance: Normal appearance. She is well-developed. She is not ill-appearing.   HENT:      Head: Normocephalic and atraumatic.      Nose: Nose normal.   Eyes:      Pupils: Pupils are equal, round, and reactive to light.   Cardiovascular:      Rate and Rhythm: Normal rate and regular rhythm.   Pulmonary:      Effort: Pulmonary effort is normal.      Breath sounds: Normal breath sounds. No wheezing.   Abdominal:      General: Abdomen is flat. Bowel sounds are normal. There is no distension.      Palpations: Abdomen is soft.      Tenderness: There is no abdominal tenderness. There is no guarding.   Skin:     General: Skin is warm and dry.      Coloration: Skin is not jaundiced.   Neurological:      Mental Status: She is alert.   Psychiatric:         Attention and Perception: Attention normal.         Mood and Affect: Affect normal.         Speech: Speech normal.         Behavior: Behavior is cooperative.      Comments: Pt was calm while speaking.         Significant Labs:  CBC: No results for input(s): "WBC", "HGB", "HCT", "PLT" in the last 48 hours.  CMP: No results for input(s): "GLU", "CALCIUM", "ALBUMIN", "PROT", "NA", "K", "CO2", "CL", "BUN", "CREATININE", "ALKPHOS", "ALT", "AST", "BILITOT" in the last 48 hours.    Significant Imaging:  Imaging results within the past 24 hours have been reviewed.    Assessment/Plan:     There are no hospital problems to display " for this patient.        Impression: Screening for colon neoplasm  Plan: Colonoscopy today    Roldan Carter MD  Gastroenterology  [facility]   No

## 2025-05-08 NOTE — ANESTHESIA POSTPROCEDURE EVALUATION
Anesthesia Post Evaluation    Patient: Juanita Noble    Procedure(s) Performed: * No procedures listed *    Final Anesthesia Type: general      Patient location during evaluation: GI PACU  Patient participation: Yes- Able to Participate  Level of consciousness: responds to stimulation  Post-procedure vital signs: reviewed and stable  Pain management: adequate  Airway patency: patent  REJI mitigation strategies: Multimodal analgesia  PONV status at discharge: No PONV  Anesthetic complications: no      Cardiovascular status: blood pressure returned to baseline  Respiratory status: unassisted and spontaneous ventilation  Hydration status: euvolemic  Follow-up not needed.              Vitals Value Taken Time   /56 05/08/25 10:28   Temp 37 °C (98.6 °F) 05/08/25 10:28   Pulse 60 05/08/25 10:28   Resp 18 05/08/25 10:28   SpO2 99 % 05/08/25 10:28         No case tracking events are documented in the log.      Pain/Olivia Score: No data recorded

## 2025-05-08 NOTE — TRANSFER OF CARE
"Anesthesia Transfer of Care Note    Patient: Juanita Noble    Procedure(s) Performed: * No procedures listed *    Patient location: GI    Anesthesia Type: general    Transport from OR: Transported from OR on room air with adequate spontaneous ventilation    Post pain: adequate analgesia    Post assessment: no apparent anesthetic complications    Post vital signs: stable    Level of consciousness: responds to stimulation    Nausea/Vomiting: no nausea/vomiting    Complications: none    Transfer of care protocol was followed      Last vitals: Visit Vitals  BP (!) 116/56 (BP Location: Right arm, Patient Position: Lying)   Pulse 60   Temp 37 °C (98.6 °F) (Oral)   Resp 18   Ht 5' 7" (1.702 m)   Wt 95.3 kg (210 lb)   SpO2 99%   Breastfeeding No   BMI 32.89 kg/m²     "

## 2025-05-08 NOTE — DISCHARGE INSTRUCTIONS
Procedure Date  5/8/25     Impression  Overall Impression:   Polyp in the transverse colon; performed cold forceps biopsy  Diverticulosis in the sigmoid colon  Rectal exam revealed no mass.  The colon was examined from the rectum to the cecum and diverticula were present in the descending and sigmoid colon.  One diminutive polyp was removed with biopsy from the transverse colon.     Recommendation  Await pathology results, due: 5/12/2025   Repeat colonoscopy in 5 years      Disposition: Discharge patient to home in stable condition.  High-fiber diet.  No driving until tomorrow.  Follow up pathology results next week.  Follow up with PCP as scheduled, return GI clinic p.r.n..     Indication  Encounter for screening colonoscopy  History of colon polyps

## 2025-05-08 NOTE — ANESTHESIA RELEASE NOTE
"Anesthesia Release from PACU Note    Patient: Juanita Noble    Procedure(s) Performed: * No procedures listed *    Anesthesia type: general    Post pain: Adequate analgesia    Post assessment: no apparent anesthetic complications    Last Vitals: Visit Vitals  BP (!) 116/56 (BP Location: Right arm, Patient Position: Lying)   Pulse 60   Temp 37 °C (98.6 °F) (Oral)   Resp 18   Ht 5' 7" (1.702 m)   Wt 95.3 kg (210 lb)   SpO2 99%   Breastfeeding No   BMI 32.89 kg/m²       Post vital signs: stable    Level of consciousness: awake    Nausea/Vomiting: no nausea/no vomiting    Complications: none    Airway Patency: patent    Respiratory: unassisted    Cardiovascular: stable and blood pressure at baseline    Hydration: euvolemic  "

## 2025-05-09 LAB
ESTROGEN SERPL-MCNC: NORMAL PG/ML
INSULIN SERPL-ACNC: NORMAL U[IU]/ML
LAB AP GROSS DESCRIPTION: NORMAL
LAB AP LABORATORY NOTES: NORMAL
T3RU NFR SERPL: NORMAL %

## 2025-05-12 ENCOUNTER — TELEPHONE (OUTPATIENT)
Dept: GASTROENTEROLOGY | Facility: CLINIC | Age: 75
End: 2025-05-12
Payer: MEDICARE

## 2025-05-12 ENCOUNTER — RESULTS FOLLOW-UP (OUTPATIENT)
Dept: GASTROENTEROLOGY | Facility: CLINIC | Age: 75
End: 2025-05-12

## 2025-05-12 NOTE — PROGRESS NOTES
This colon polyp was an inflammatory polyp.  Recommendation: Repeat colonoscopy in 5 years due to history of colon polyps.

## 2025-05-12 NOTE — TELEPHONE ENCOUNTER
Results and recommendations called to patient. Verbalized understanding. 5 year recall placed on chart.              ----- Message from Roldan Carter MD sent at 5/12/2025  8:54 AM CDT -----  This colon polyp was an inflammatory polyp.  Recommendation: Repeat colonoscopy in 5 years due to history of colon polyps.  ----- Message -----  From: Lab, Background User  Sent: 5/9/2025   9:03 AM CDT  To: Roldan Carter MD

## 2025-07-07 DIAGNOSIS — N18.32 STAGE 3B CHRONIC KIDNEY DISEASE: Primary | ICD-10-CM

## 2025-07-14 ENCOUNTER — TELEPHONE (OUTPATIENT)
Dept: NEPHROLOGY | Facility: CLINIC | Age: 75
End: 2025-07-14
Payer: MEDICARE

## 2025-07-14 NOTE — TELEPHONE ENCOUNTER
----- Message from Didi sent at 7/14/2025 10:56 AM CDT -----  Regarding: Appt  Jessica Medrano's office calling to check status on referral. Asked if she could get a call back at [phone]

## 2025-07-14 NOTE — TELEPHONE ENCOUNTER
Evaristo w/ [facility] at  office, she took appt date and time for patient and is going to call pt with information.

## 2025-07-21 RX ORDER — POTASSIUM CHLORIDE 20 MEQ/1
TABLET, EXTENDED RELEASE ORAL
COMMUNITY

## 2025-07-21 RX ORDER — SEMAGLUTIDE 0.68 MG/ML
INJECTION, SOLUTION SUBCUTANEOUS
COMMUNITY
Start: 2025-07-10

## 2025-07-21 RX ORDER — GLIMEPIRIDE 2 MG/1
TABLET ORAL
COMMUNITY
End: 2025-07-22

## 2025-07-22 ENCOUNTER — OFFICE VISIT (OUTPATIENT)
Dept: NEPHROLOGY | Facility: CLINIC | Age: 75
End: 2025-07-22
Payer: MEDICARE

## 2025-07-22 VITALS
HEIGHT: 67 IN | SYSTOLIC BLOOD PRESSURE: 140 MMHG | RESPIRATION RATE: 16 BRPM | BODY MASS INDEX: 32.65 KG/M2 | DIASTOLIC BLOOD PRESSURE: 90 MMHG | HEART RATE: 74 BPM | OXYGEN SATURATION: 98 % | WEIGHT: 208 LBS

## 2025-07-22 DIAGNOSIS — E11.21 DIABETIC NEPHROPATHY ASSOCIATED WITH TYPE 2 DIABETES MELLITUS: ICD-10-CM

## 2025-07-22 DIAGNOSIS — N18.32 STAGE 3B CHRONIC KIDNEY DISEASE: ICD-10-CM

## 2025-07-22 DIAGNOSIS — I12.9 HYPERTENSIVE NEPHROSCLEROSIS, STAGE 1 THROUGH STAGE 4 OR UNSPECIFIED CHRONIC KIDNEY DISEASE: Primary | ICD-10-CM

## 2025-07-22 PROCEDURE — 99999 PR PBB SHADOW E&M-EST. PATIENT-LVL V: CPT | Mod: PBBFAC,,, | Performed by: NURSE PRACTITIONER

## 2025-07-22 PROCEDURE — 99215 OFFICE O/P EST HI 40 MIN: CPT | Mod: PBBFAC | Performed by: NURSE PRACTITIONER

## 2025-07-22 RX ORDER — VERAPAMIL HCL 240 MG
TABLET, EXTENDED RELEASE ORAL
COMMUNITY
Start: 2025-07-17

## 2025-07-22 RX ORDER — CLOBETASOL PROPIONATE 0.5 MG/G
OINTMENT TOPICAL 2 TIMES DAILY
COMMUNITY

## 2025-07-22 NOTE — PROGRESS NOTES
[facility] History and Physical  Patient Name: Juanita Noble  MRN: [id number]  Age: 75 y.o.  : 1950    Date: 2025     Chief Complaint: Establish Care    HPI :   Ms Noble presents to Nephrology clinic to establish care. She is followed by ABBIE Deal as her primary care provider.     HLD:  Zetia 10 mg daily, rosuvastatin 5 mg daily    HTN: diagnosed in her 50s. Current regimen includes verapamil 240 mg daily    Has cardiac pacemaker: Placed when she was 65. Follows with Dr. Stover in [place].     DM2: diagnosed in her 50s. Current regimen includes Ozempic, stopped amaryl last week by PCP    Nephrology history: Brother with CKD, Hx of nephrolithiasis x1 but passed on her own, denies recurrent UTIs. No OTC medications including NSAIDs.     Patient denies any CP, SOB, peripheral edema, dysuria, hematuria, changes in urinary habits, or increased frequency of urination.     Past Medical History:  has a past medical history of Cardiac pacemaker, Diabetes mellitus, Hypertension, and Mixed hyperlipidemia.     Past Surgical History:   has a past surgical history that includes Hysterectomy; Elbow surgery; Cataract extraction, bilateral; Insertion of pacemaker; and Tear duct surgery.     Family History:  family history includes Breast cancer in her sister and sister. No family history of kidney disease.     Social History:   reports that she has never smoked. She has never used smokeless tobacco. She reports that she does not drink alcohol and does not use drugs.     Allergies: is allergic to cardura [doxazosin], lisinopril, procardia [nifedipine], singulair [montelukast], toprol xl [metoprolol succinate], ciprofloxacin, flonase [fluticasone], norvasc [amlodipine], tobramycin, and medrol [methylprednisolone].     Medications: Reviewed including OTC medications, herbal supplements, and NSAIDS.     Old records have been reviewed.      Review of  Systems:  ROS: A 10 point ROS was completed and found to be negative except for that mentioned above.          Physical Exam:  Vitals:    07/22/25 0842   BP: (!) 140/90   Pulse: 74   Resp: 16       Constitutional: sitting in chair, in NAD  Eyes: EOMI, white sclera  ENMT: moist mucus membranes, nares patent  Cardiovascular: normal rate, S1/S2 noted, no edema  Respiratory: symmetrical chest expansion, CTA-B  Gastrointestinal: +BS, soft, NT/ND  Musculoskeletal: normal, no joint erythema/effusions  Skin: no rash, no purpura, warm extremities  Neurological: Alert and Oriented x 3, afocal    Labs:   Lab Results   Component Value Date     06/27/2022    K 4.3 06/27/2022    CREATININE 0.91 06/27/2022    ALT 28 06/27/2022    AST 14 (L) 06/27/2022    HGBA1C 7.1 (A) 09/21/2022    LDLCALC 40 06/27/2022    CHOL 128 06/27/2022    HDL 47 06/27/2022    TRIG 207 (H) 06/27/2022    TSH 0.994 06/27/2022    WBC 8.99 06/27/2022    HGB 12.9 06/27/2022    HCT 38.7 06/27/2022     06/27/2022          Assessment/Plan:       CKD stage IIIb due to hypertensive nephrosclerosis, diabetic nephropathy. Baseline sCr TBD, today sCr pending. Counseled to avoid nephrotoxic agents such as NSAIDs. Will obtain baseline renal ultrasound.    Proteinuria: urine Prot:Creat ratio is pending. Patient is not on RAAS blockade.     Anemia: CBC pending    BMD: Calcium and Phosphorus PTH, Vit D pending    HTN: well controlled with current meds     DM type 2: not on ACE/ARB or SGTL2i    RTC 6 months with CBC, RFP, UA, urine for Prot:creat ratio, PTH, Vit D      Signature:             ABBIE Garza  [facility]  [address]  [facility] [address]  [phone]        30 minutes of total time spent on the encounter, which includes face to face time and non-face to face time preparing to see the patient (eg, review of tests), Obtaining and/or reviewing separately obtained history, Documenting clinical  information in the electronic or other health record, Independently interpreting results (not separately reported) and communicating results to the patient/family/caregiver, or Care coordination (not separately reported).       Date of encounter: 7/22/25

## 2025-07-23 ENCOUNTER — TELEPHONE (OUTPATIENT)
Dept: NEPHROLOGY | Facility: CLINIC | Age: 75
End: 2025-07-23
Payer: MEDICARE

## 2025-07-23 ENCOUNTER — HOSPITAL ENCOUNTER (OUTPATIENT)
Dept: RADIOLOGY | Facility: HOSPITAL | Age: 75
Discharge: HOME | End: 2025-07-23
Attending: NURSE PRACTITIONER
Payer: MEDICARE

## 2025-07-23 DIAGNOSIS — N18.32 STAGE 3B CHRONIC KIDNEY DISEASE: ICD-10-CM

## 2025-07-23 PROCEDURE — 76775 US EXAM ABDO BACK WALL LIM: CPT | Mod: 26,,, | Performed by: RADIOLOGY

## 2025-07-23 PROCEDURE — 76775 US EXAM ABDO BACK WALL LIM: CPT | Mod: TC

## 2025-07-23 NOTE — TELEPHONE ENCOUNTER
----- Message from ABBIE Worrell sent at 7/23/2025  8:50 AM CDT -----  Please let patient know that kidney function remains stable but levels are likely around her baseline CKD IIIb. She needs to be sure to hydrate well and avoid NSAIDs. I cannot speak to urine studies   as no urine sample was collected. Thank you!  ----- Message -----  From: Lab, Background User  Sent: 7/22/2025  10:58 AM CDT  To: ABBIE Dutton

## 2025-07-24 ENCOUNTER — TELEPHONE (OUTPATIENT)
Dept: NEPHROLOGY | Facility: CLINIC | Age: 75
End: 2025-07-24
Payer: MEDICARE

## 2025-07-24 NOTE — TELEPHONE ENCOUNTER
----- Message from ABBIE Worrell sent at 7/23/2025  1:25 PM CDT -----  Please let patient know that kidney ultrasound showed findings of CKD which was expected. Does have right renal cyst but it is small and simple so benign.Thank you!  ----- Message -----  From: Interface, Rad Results In  Sent: 7/23/2025  11:13 AM CDT  To: ABBIE Dutton